# Patient Record
Sex: FEMALE | Race: WHITE | NOT HISPANIC OR LATINO | URBAN - METROPOLITAN AREA
[De-identification: names, ages, dates, MRNs, and addresses within clinical notes are randomized per-mention and may not be internally consistent; named-entity substitution may affect disease eponyms.]

---

## 2024-06-06 ENCOUNTER — LAB OUTSIDE AN ENCOUNTER (OUTPATIENT)
Dept: URBAN - METROPOLITAN AREA CLINIC 23 | Facility: CLINIC | Age: 64
End: 2024-06-06

## 2024-06-06 ENCOUNTER — OFFICE VISIT (OUTPATIENT)
Dept: URBAN - METROPOLITAN AREA CLINIC 23 | Facility: CLINIC | Age: 64
End: 2024-06-06
Payer: COMMERCIAL

## 2024-06-06 ENCOUNTER — DASHBOARD ENCOUNTERS (OUTPATIENT)
Age: 64
End: 2024-06-06

## 2024-06-06 VITALS
DIASTOLIC BLOOD PRESSURE: 75 MMHG | SYSTOLIC BLOOD PRESSURE: 134 MMHG | WEIGHT: 173.6 LBS | TEMPERATURE: 98.8 F | HEART RATE: 68 BPM

## 2024-06-06 DIAGNOSIS — R05.3 CHRONIC COUGH: ICD-10-CM

## 2024-06-06 DIAGNOSIS — Z86.010 PERSONAL HISTORY OF COLONIC POLYPS: ICD-10-CM

## 2024-06-06 DIAGNOSIS — K21.9 GERD: ICD-10-CM

## 2024-06-06 DIAGNOSIS — R13.10 DYSPHAGIA: ICD-10-CM

## 2024-06-06 DIAGNOSIS — R49.0 HOARSENESS: ICD-10-CM

## 2024-06-06 PROBLEM — 305058001: Status: ACTIVE | Noted: 2024-06-06

## 2024-06-06 PROBLEM — 50219008: Status: ACTIVE | Noted: 2024-06-06

## 2024-06-06 PROBLEM — 428283002: Status: ACTIVE | Noted: 2024-06-06

## 2024-06-06 PROCEDURE — 99204 OFFICE O/P NEW MOD 45 MIN: CPT | Performed by: INTERNAL MEDICINE

## 2024-06-06 NOTE — HPI-TODAY'S VISIT:
The patient presents with a history of coughing, sinus, and ear issues since the fall. She has been through three rounds of antibiotics and had a lung function test, which was normal. Her primary care doctor suspected silent GERD and prescribed a trial of Prilosec 20 mg for a month. The patient then started a prescription medication, which has reduced her coughing but not completely resolved her hoarseness. 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 A swallow test showed that her esophagus is slow to empty.  The patient has a history of a colonoscopy with Dr. Melisa Edwards in June, during which a black polyp was found, necessitating surgery. She has another colonoscopy scheduled for the end of the month. She also reports a possible diagnosis of CREST syndrome based on recent blood work and has an appointment with a rheumatologist on Monday. The patient denies any sensation of food getting caught when swallowing and does not experience heartburn, indigestion, or other GERD symptoms besides hoarseness. She has seen an ENT, Dr. Torrez, for the hoarseness and has undergone vocal therapy.  The patient has been taking Voquezna  for a month, which has improved her coughing and hoarseness to some extent. She still experiences hoarseness when talking a lot or singing and occasionally feels short of breath. She has made dietary changes, cutting out gluten and dairy, and has lost some weight. She also participates in water aerobics three times a week. The patient had surgery with Dr. Michele on July 21st of the previous year for a tubular villus adenoma, undergoing an ileocolic resection. She recovered well and had a six-month checkup in March. The patient is unsure if she has had an upper endoscopy in the past.

## 2024-06-06 NOTE — PREVIOUS WORKUP REVIEWED EXTERNAL MEDICAL RECORD
.ENDOSCOPIEScolonoscopy 2023- Dr. Edwards LABSIMAGESct chest 5/2024- no lung abnormality. congenital abnormality of the aortaesophogram- 5/2024- shows stasis of the different foods used during MBS in the distal esophagusno aspiration

## 2024-07-02 ENCOUNTER — TELEPHONE ENCOUNTER (OUTPATIENT)
Dept: URBAN - METROPOLITAN AREA CLINIC 6 | Facility: CLINIC | Age: 64
End: 2024-07-02

## 2024-07-10 ENCOUNTER — TELEPHONE ENCOUNTER (OUTPATIENT)
Dept: URBAN - METROPOLITAN AREA CLINIC 6 | Facility: CLINIC | Age: 64
End: 2024-07-10

## 2024-07-29 ENCOUNTER — OFFICE VISIT (OUTPATIENT)
Dept: URBAN - METROPOLITAN AREA MEDICAL CENTER 27 | Facility: MEDICAL CENTER | Age: 64
End: 2024-07-29

## 2024-08-09 ENCOUNTER — WEB ENCOUNTER (OUTPATIENT)
Dept: URBAN - METROPOLITAN AREA CLINIC 12 | Facility: CLINIC | Age: 64
End: 2024-08-09

## 2024-08-15 ENCOUNTER — OFFICE VISIT (OUTPATIENT)
Dept: URBAN - METROPOLITAN AREA CLINIC 23 | Facility: CLINIC | Age: 64
End: 2024-08-15
Payer: COMMERCIAL

## 2024-08-15 ENCOUNTER — LAB OUTSIDE AN ENCOUNTER (OUTPATIENT)
Dept: URBAN - METROPOLITAN AREA CLINIC 23 | Facility: CLINIC | Age: 64
End: 2024-08-15

## 2024-08-15 VITALS
TEMPERATURE: 97.3 F | DIASTOLIC BLOOD PRESSURE: 80 MMHG | HEART RATE: 73 BPM | BODY MASS INDEX: 28.61 KG/M2 | HEIGHT: 66 IN | SYSTOLIC BLOOD PRESSURE: 143 MMHG | WEIGHT: 178 LBS

## 2024-08-15 DIAGNOSIS — R13.19 CERVICAL DYSPHAGIA: ICD-10-CM

## 2024-08-15 DIAGNOSIS — K21.9 GERD: ICD-10-CM

## 2024-08-15 DIAGNOSIS — R49.0 HOARSENESS: ICD-10-CM

## 2024-08-15 DIAGNOSIS — Z86.010 PERSONAL HISTORY OF COLONIC POLYPS: ICD-10-CM

## 2024-08-15 PROCEDURE — 99214 OFFICE O/P EST MOD 30 MIN: CPT | Performed by: INTERNAL MEDICINE

## 2024-08-15 NOTE — PREVIOUS WORKUP REVIEWED EXTERNAL MEDICAL RECORD
.ENDOSCOPIEScolonoscopy 2023- Dr. Edwards egd 7/29/2024- normal appearing esophagus, normal stomach, and duodeum. gastric antrum body show no abnormality. distal esophagus no EOELABSIMAGESct chest 5/2024- no lung abnormality. congenital abnormality of the aortaesophogram- 5/2024- shows stasis of the different foods used during MBS in the distal esophagusno aspiration

## 2024-08-15 NOTE — HPI-TODAY'S VISIT:
- Presents with chronic hoarseness of voice, ongoing for years - Denies current symptoms of acid reflux or indigestion - Reports dry mouth and eyes, attributed to underlying rheumatologic condition - History of being evaluated by ENT for hoarseness, who suspected acid reflux - Underwent lung function tests in January this year - Primary care physician suspected silent GERD based on voice quality and initiated treatment with Prilosec, later switched to Voquezna - Imaging studies revealed a heart defect with an artery looping back, possibly compressing the esophagus - Referred to rheumatologist for evaluation of possible CREST syndrome based on positive blood markers - Difficulty swallowing, feels like food gets stuck, especially with dry or solid foods like meat; liquids and soft foods easier to swallow - Previous swallowing study showed slowing of bolus transit at the level of the arterial loop and in the lower esophagus

## 2024-08-23 ENCOUNTER — TELEPHONE ENCOUNTER (OUTPATIENT)
Dept: URBAN - METROPOLITAN AREA CLINIC 23 | Facility: CLINIC | Age: 64
End: 2024-08-23

## 2024-09-16 ENCOUNTER — LAB OUTSIDE AN ENCOUNTER (OUTPATIENT)
Dept: URBAN - METROPOLITAN AREA CLINIC 23 | Facility: CLINIC | Age: 64
End: 2024-09-16

## 2024-09-16 ENCOUNTER — OFFICE VISIT (OUTPATIENT)
Dept: URBAN - METROPOLITAN AREA CLINIC 23 | Facility: CLINIC | Age: 64
End: 2024-09-16
Payer: COMMERCIAL

## 2024-09-16 VITALS
TEMPERATURE: 97.2 F | HEART RATE: 80 BPM | BODY MASS INDEX: 29.77 KG/M2 | WEIGHT: 185.2 LBS | HEIGHT: 66 IN | SYSTOLIC BLOOD PRESSURE: 134 MMHG | DIASTOLIC BLOOD PRESSURE: 80 MMHG

## 2024-09-16 DIAGNOSIS — R13.19 DYSPHAGIA: ICD-10-CM

## 2024-09-16 DIAGNOSIS — R49.0 HOARSENESS: ICD-10-CM

## 2024-09-16 PROCEDURE — 99213 OFFICE O/P EST LOW 20 MIN: CPT

## 2024-09-16 NOTE — HPI-TODAY'S VISIT:
64 yr old female here for follow up after esophageal manometry.    Last seen 08/2024 by Dr. Figueroa for chronic hoarseness of voice, ongoing for years.  CT chest concerning for aberrant right subclavian artery that could be possibly compressing esophagus.  Stopped Voquenza as patient noticed no improvement. Recent EGD 07/2024 and colon 2023 unremarkable. Modified barium swallow did not suggest aspiration.   Referred to rheumatology for evaluation of possible crest syndrome based on positive blood markers.  Previous swallowing study showed slowing of bolus transit as a level arterial duplex of the lower esophagus.       She states that she is still having dysphagia to solids. No issues with liquids. She notes burping more frequent since stopping Voquenza. Has tried OTC Prilosec but no improvement in symptoms. Has work with feeding therapist in the past and she states it did not help symptoms.   Has seen vascular (Dr. Moreira), going back Wednesday.   She has seen rheumatology at [facility], goes back next Friday       Colonoscopy 2023 (Dr. Edwards) - normal. Repeat due 2027.    EGD 07/29/2024-normal-appearing esophagus, normal stomach and duodenum.  Gastric antrum body showed no abnormality.  Distal esophagus no EOE.   CT chest 05/2024 no lung abnormality.  Congenital abnormality aorta.   Esophogram 05/2024-show stasis different foods use during MBS in the distal esophagus.  No aspiration.   Esophageal manometry 08/2024- final report pending

## 2024-09-24 ENCOUNTER — LAB OUTSIDE AN ENCOUNTER (OUTPATIENT)
Dept: URBAN - METROPOLITAN AREA CLINIC 23 | Facility: CLINIC | Age: 64
End: 2024-09-24

## 2024-10-24 ENCOUNTER — OFFICE VISIT (OUTPATIENT)
Dept: URBAN - METROPOLITAN AREA CLINIC 23 | Facility: CLINIC | Age: 64
End: 2024-10-24

## 2024-10-28 ENCOUNTER — TELEPHONE ENCOUNTER (OUTPATIENT)
Dept: URBAN - METROPOLITAN AREA CLINIC 12 | Facility: CLINIC | Age: 64
End: 2024-10-28

## 2024-10-28 ENCOUNTER — OFFICE VISIT (OUTPATIENT)
Dept: URBAN - METROPOLITAN AREA CLINIC 23 | Facility: CLINIC | Age: 64
End: 2024-10-28

## 2024-10-28 VITALS
BODY MASS INDEX: 30.37 KG/M2 | HEIGHT: 66 IN | WEIGHT: 189 LBS | DIASTOLIC BLOOD PRESSURE: 79 MMHG | HEART RATE: 77 BPM | SYSTOLIC BLOOD PRESSURE: 142 MMHG | TEMPERATURE: 97.5 F

## 2024-10-28 RX ORDER — ESOMEPRAZOLE MAGNESIUM 40 MG/1
1 CAPSULE CAPSULE, DELAYED RELEASE PELLETS ORAL ONCE A DAY
Qty: 90 CAPSULE | Refills: 1 | OUTPATIENT
Start: 2024-10-28

## 2024-10-28 NOTE — PREVIOUS WORKUP REVIEWED EXTERNAL MEDICAL RECORD
.ENDOSCOPIESegd 7/2024- biopsies negative , no h pylori.  distal esophageal biopsies negativeLABSIMAGES

## 2024-10-31 ENCOUNTER — OFFICE VISIT (OUTPATIENT)
Dept: URBAN - METROPOLITAN AREA CLINIC 23 | Facility: CLINIC | Age: 64
End: 2024-10-31

## 2024-11-21 ENCOUNTER — OFFICE VISIT (OUTPATIENT)
Dept: URBAN - METROPOLITAN AREA CLINIC 23 | Facility: CLINIC | Age: 64
End: 2024-11-21

## 2025-07-29 ENCOUNTER — TELEPHONE ENCOUNTER (OUTPATIENT)
Dept: URBAN - METROPOLITAN AREA CLINIC 19 | Facility: CLINIC | Age: 65
End: 2025-07-29

## 2025-07-29 RX ORDER — ESOMEPRAZOLE MAGNESIUM 40 MG/1
1 CAPSULE CAPSULE, DELAYED RELEASE PELLETS ORAL ONCE A DAY
Qty: 90 CAPSULE | Refills: 1
Start: 2024-10-28